# Patient Record
Sex: MALE | Race: OTHER | Employment: FULL TIME | URBAN - METROPOLITAN AREA
[De-identification: names, ages, dates, MRNs, and addresses within clinical notes are randomized per-mention and may not be internally consistent; named-entity substitution may affect disease eponyms.]

---

## 2017-12-15 ENCOUNTER — OFFICE VISIT (OUTPATIENT)
Dept: FAMILY MEDICINE CLINIC | Age: 47
End: 2017-12-15

## 2017-12-15 VITALS
TEMPERATURE: 98.5 F | HEART RATE: 81 BPM | WEIGHT: 166 LBS | SYSTOLIC BLOOD PRESSURE: 117 MMHG | BODY MASS INDEX: 26 KG/M2 | DIASTOLIC BLOOD PRESSURE: 72 MMHG

## 2017-12-15 DIAGNOSIS — R09.81 NASAL CONGESTION: ICD-10-CM

## 2017-12-15 DIAGNOSIS — H57.12 EYE PAIN, LEFT: Primary | ICD-10-CM

## 2017-12-15 RX ORDER — NAPROXEN 500 MG/1
500 TABLET ORAL 2 TIMES DAILY WITH MEALS
Qty: 30 TAB | Refills: 0 | Status: SHIPPED | OUTPATIENT
Start: 2017-12-15

## 2017-12-15 RX ORDER — FLUTICASONE PROPIONATE 50 MCG
SPRAY, SUSPENSION (ML) NASAL
Qty: 1 BOTTLE | Refills: 2 | Status: SHIPPED | OUTPATIENT
Start: 2017-12-15

## 2017-12-15 NOTE — PROGRESS NOTES
Patient seen for discharge to review the AVS, prescriptions, pharmacy location and the printed Good Rx coupon for Flonase. We also discussed going to the [facility] clinic or [facility] to have an eye exam. The patient understands to return to the [facility] the eye exam determines that he will need to see an eye specialist. He declined the Flu vaccine today.  Darren Ramirez RN

## 2017-12-15 NOTE — PATIENT INSTRUCTIONS

## 2017-12-15 NOTE — PROGRESS NOTES
Assessment/Plan:    Diagnoses and all orders for this visit:    1. Eye pain, left  -     naproxen (NAPROSYN) 500 mg tablet; Take 1 Tab by mouth two (2) times daily (with meals). -     REFERRAL TO OPTOMETRY    2. Nasal congestion  -     fluticasone (FLONASE) 50 mcg/actuation nasal spray; Si spray each nostril bid    I have asked the pt to get a dilated eye exam in the next day or so because of the inability to see his left eye vessels clearly. No hx of HTN or glaucoma   He states that he will and will f/up here as needed    Follow-up Disposition:  Return if symptoms worsen or fail to improve. ALEE Suarez expressed understanding of this plan. An AVS was printed and given to the patient.      ----------------------------------------------------------------------    Chief Complaint   Patient presents with    Headache     x 2 days       History of Present Illness:  New pt, 51 yo with no significant past medical history to report, here for 3 days of on/off left sided headache behind his eye. No injury or trauma to the eye. He was working in the cold (he is a ) and thought that this might be the cause of his pain. He rested a lot over the past few days but has not had a big change in his sxs. He is a non smoker  He had an eye exam  at [facility] and needed rx lenses for which he wears while reading  He has been taking BC powder and baby asa for his pain  He denies fever, sore throat, nasal discharge. He has a feeling that his left side of his nose is \"clogged\"        Past Medical History:   Diagnosis Date    Foreign body in left lower extremity 6/15/2016    Malaria        Current Outpatient Prescriptions   Medication Sig Dispense Refill    fluticasone (FLONASE) 50 mcg/actuation nasal spray Si spray each nostril bid 1 Bottle 2    naproxen (NAPROSYN) 500 mg tablet Take 1 Tab by mouth two (2) times daily (with meals).  30 Tab 0    VIAGRA 100 mg tablet TAKE 1 TABLET BY MOUTH AS NEEDED 10 Tab 0    cholecalciferol, vitamin D3, (VITAMIN D3) 2,000 unit tab Take 2,000 Units by mouth daily. No Known Allergies    Social History   Substance Use Topics    Smoking status: Never Smoker    Smokeless tobacco: Never Used    Alcohol use 0.0 oz/week     0 Standard drinks or equivalent per week      Comment: occassionally       Family History   Problem Relation Age of Onset    Hypertension Father     Stroke Father        Physical Exam:     Visit Vitals    /72 (BP 1 Location: Right arm, BP Patient Position: Sitting)    Pulse 81    Temp 98.5 °F (36.9 °C) (Oral)    Wt 166 lb (75.3 kg)    BMI 26 kg/m2     gen looks well, pleasant  A&Ox3  WDWN NAD  Respirations normal and non labored  HEENT- TM's benign  Nares swollen red turbinates isabel  OP clear  Fundoscopic exam- PERRLA, EOMI.  His left eye vessels appear normal. His right eye vessels are poorly visualized and I have asked him to get an eye exam in the next day or so for dilation and better visualization   Lungs are CTA isabel

## 2017-12-15 NOTE — PROGRESS NOTES
Coordination of Care  1. Have you been to the ER, urgent care clinic since your last visit? Hospitalized since your last visit? No    2. Have you seen or consulted any other health care providers outside of the [address] [place] [address] since your last visit? Include any pap smears or colon screening. No    Medications  Does the patient need refills? YES    Learning Assessment Complete?  yes